# Patient Record
(demographics unavailable — no encounter records)

---

## 2024-10-28 NOTE — ASSESSMENT
[FreeTextEntry1] : CAD -patient has poor understanding about's disease.  #1 dual antiplatelet therapy has been discussed with him.  He is asymptomatic now.  I recommend echocardiography for LV size, LV thickness, and wall motion, LVEF and valve morphology.  I also recommended stress test prior to cardiac rehab.  He will be referred to cardiac rehab after stress test.  Diabetes -low normal blood pressure less than 130/80, A1c less than 7, LDL 70; diabetic diet; continue metformin; recent A1c not available to me.  Dyslipidemia -glucose diet has been discussed with him at great length, clinical medication, lipid panel and if needed I will change Lipitor to Crestor.  Hypertension -low-salt diet, continue losartan 50 mg daily, BP monitoring.  Aggressive risk factor modifier has been discussed with patient to get in regular walking, compliance to medication, low-salt/low-cholesterol/diabetic diet.  Patient will be reeval  by me after cardiac testing

## 2024-10-28 NOTE — HISTORY OF PRESENT ILLNESS
[FreeTextEntry1] : 66 years old  patient with history of diabetes, hypertension, dyslipidemia, GERD, fatty liver, BPH who went to [place] for vacation where he developed significant chest pressure along with shortness of breath went to emergency room where he had significant uncontrolled hypertension.  Workup confirmed CAD and he had 2 stent placement although results not available to me.  Since admitted to [facility] he had a cough was seen in rather emergency room for and was discharged home.  Now he does not exert chest pain but does get short of breath on more than usual exertion.  Denies PND, orthopnea, diaphoresis, dizziness, palpitation, pedal edema and cardiac symptoms.  No smoking.  No previous history of CHF, MI, syncope.  October 22, 2024   ETT was done using his Geoff protocol; he exercised for 8.5 minutes with peak heart rate 131 bpm, peak blood pressure 198/60 mmHg.  No symptoms, and there was ischemic EKG changes October 21, 2024 echo confirmed normal LV size, LV thickness, LV wall motion, LVEF 60 to 65% without significant valvular abnormality.

## 2024-12-20 NOTE — HISTORY OF PRESENT ILLNESS
[FreeTextEntry1] : Follow-up [de-identified] : weight counseling  He states he has issues with his self control. / impulse eating Discussed starting Ozempic not exercising. counseled to start.

## 2024-12-20 NOTE — HISTORY OF PRESENT ILLNESS
[FreeTextEntry1] : Follow-up [de-identified] : weight counseling  He states he has issues with his self control. / impulse eating Discussed starting Ozempic not exercising. counseled to start.

## 2024-12-20 NOTE — ASSESSMENT
[FreeTextEntry1] : , , Obesity: Counseling on diet, exercise and lifestyle modifications. Weight loss goals discussed Nutritionist kendall  Discussed trial of ozempic  CAD: s/p 2 stents referral to follow-up with Cardiology TYSHAWN Acuna Counseled at length on aggressive lifestyle factor modifications.   Diabetes: HbA1C %= 6.7-->7.2-->7.4 Advised to Start Metformin.  repeat A1C in 3 months  Counseled patient to cut down on processed sugar and carbohydrates. Increase Aerobic exercise and resistance training.  HLD: Crestor Zetia -->205-->119 Counseled on diet, exercise and lifestyle modifications. Advised a diet centered around whole plant based foods.  Vegetables, fruits, legumes, grains, nuts.  Advised limiting intake of refined processed foods (refined white flour products, refined sugar, soda, juice).  Limit intake of meat, dairy, eggs, oil.  HTN: Losartan Nebivolol    GERD: Counseled on dietary modifications. Avoid tomato products, mint, citrus (drinks / fruit), fried fatty foods, caffeine, chocolate. Avoid food / drink ,2 hours prior to bedtime / napping -Pantoprazole  -Leticia / Rolaids PRN -Gastro eval / H Pylori testing  if not improving

## 2025-07-24 NOTE — ASSESSMENT
[Vaccines Reviewed] : Immunizations reviewed today. Please see immunization details in the vaccine log within the immunization flowsheet.  [FreeTextEntry1] : , , Preventative: Counseled on health promotion and disease prevention. EKG and wellness labs done Gastroenterology referral provided for colonoscopy Discussed routine immunizations  Heart Screen:  EKG nsr  Obesity: Discussed Ozempic as an option Counseling on diet, exercise and lifestyle modifications. Weight loss goals discussed Nutritionist kendall  Discussed trial of ozempic  CAD: s/p 2 stents referral to follow-up with Cardiology TYSHAWN Acuna Counseled at length on aggressive lifestyle factor modifications.   Diabetes: HbA1C %= 6.7-->7.2-->7.4 Advised to Start Metformin.  repeat A1C in 3 months  Counseled patient to cut down on processed sugar and carbohydrates. Increase Aerobic exercise and resistance training.  HLD: Crestor Zetia -->205-->119 Counseled on diet, exercise and lifestyle modifications. Advised a diet centered around whole plant based foods.  Vegetables, fruits, legumes, grains, nuts.  Advised limiting intake of refined processed foods (refined white flour products, refined sugar, soda, juice).  Limit intake of meat, dairy, eggs, oil.  HTN: Losartan Nebivolol   GERD: Counseled on dietary modifications. Avoid tomato products, mint, citrus (drinks / fruit), fried fatty foods, caffeine, chocolate. Avoid food / drink ,2 hours prior to bedtime / napping -Pantoprazole  -Leticia / Rolaids PRN -Gastro eval / H Pylori testing  if not improving